# Patient Record
Sex: MALE | Race: WHITE
[De-identification: names, ages, dates, MRNs, and addresses within clinical notes are randomized per-mention and may not be internally consistent; named-entity substitution may affect disease eponyms.]

---

## 2018-03-02 ENCOUNTER — HOSPITAL ENCOUNTER (OUTPATIENT)
Dept: HOSPITAL 80 - FED | Age: 68
Setting detail: OBSERVATION
LOS: 1 days | Discharge: HOME | End: 2018-03-03
Attending: INTERNAL MEDICINE | Admitting: INTERNAL MEDICINE
Payer: COMMERCIAL

## 2018-03-02 DIAGNOSIS — J45.909: ICD-10-CM

## 2018-03-02 DIAGNOSIS — K76.89: ICD-10-CM

## 2018-03-02 DIAGNOSIS — N20.0: ICD-10-CM

## 2018-03-02 DIAGNOSIS — Z88.2: ICD-10-CM

## 2018-03-02 DIAGNOSIS — K52.9: Primary | ICD-10-CM

## 2018-03-02 DIAGNOSIS — Z87.442: ICD-10-CM

## 2018-03-02 PROCEDURE — 96361 HYDRATE IV INFUSION ADD-ON: CPT

## 2018-03-02 PROCEDURE — G0378 HOSPITAL OBSERVATION PER HR: HCPCS

## 2018-03-02 PROCEDURE — 99285 EMERGENCY DEPT VISIT HI MDM: CPT

## 2018-03-02 PROCEDURE — 96374 THER/PROPH/DIAG INJ IV PUSH: CPT

## 2018-03-02 PROCEDURE — 93005 ELECTROCARDIOGRAM TRACING: CPT

## 2018-03-02 PROCEDURE — 74176 CT ABD & PELVIS W/O CONTRAST: CPT

## 2018-03-03 VITALS
TEMPERATURE: 98.1 F | DIASTOLIC BLOOD PRESSURE: 66 MMHG | RESPIRATION RATE: 18 BRPM | HEART RATE: 67 BPM | SYSTOLIC BLOOD PRESSURE: 130 MMHG

## 2018-03-03 VITALS — OXYGEN SATURATION: 95 %

## 2018-03-03 LAB
PLATELET # BLD: 185 10^3/UL (ref 150–400)
PLATELET # BLD: 236 10^3/UL (ref 150–400)

## 2018-03-03 NOTE — CPEKG
Heart Rate: 66

RR Interval: 909

P-R Interval: 160

QRSD Interval: 78

QT Interval: 380

QTC Interval: 399

P Axis: 58

QRS Axis: 22

T Wave Axis: 53

EKG Severity - NORMAL ECG -

EKG Impression: SINUS RHYTHM

Electronically Signed By: Alex Doss 03-Mar-2018 05:37:11

## 2018-03-03 NOTE — ASMTLACE
LACE

 

Length of stay for            Answers:  Less than 1 day                       

current admission                                                             

Acuity / Level of             Answers:  No                                    

Care: Did the patient                                                         

have an inpatient                                                             

admission?                                                                    

# of Emergency department     Answers:  1-2                                   

visits in the last 6                                                          

months                                                                        

Score: 1

 

Date Signed:  03/03/2018 02:05 PM

Electronically Signed By:Hyun Abbasi LCSW

## 2018-03-03 NOTE — EDPHY
H & P


Time Seen by Provider: 03/02/18 23:56


HPI/ROS: 





CHIEF COMPLAINT:  Left flank pain





HISTORY OF PRESENT ILLNESS:  68-year-old male with a known history of kidney 

stones presents to the emergency department with abrupt onset of pain in his 

left upper quadrant..  The patient has had 2 previous kidney stones which she 

was able to pass on his own.  No nausea or vomiting.  Denies urinary symptoms.  

Denies chest pain or difficulty breathing.  He states that it feels similar to 

his previous kidney stones in that it is colicky although he states this is not 

as severe.





REVIEW OF SYSTEMS:


Constitutional:  No fever, no chills.


Eyes:  No double or blurry vision.


ENT:  No sore throat.


Respiratory:  No cough, no shortness of breath.


Cardiac:  No chest pain.


Gastrointestinal:  Abdominal pain as above.  No vomiting or diarrhea.


Genitourinary:  No dysuria.


Musculoskeletal:  No neck or back pain.


Skin:  No rashes.


Neurological:  No headache.


Past Medical/Surgical History: 





Kidney stones, asthma, osteoporosis, hip surgery


Social History: 








Smoking Status: Never smoked


Physical Exam: 





General Appearance:  Alert, no distress.


Eyes:  Pupils equal and round.  Extraocular motions are all intact.


ENT:  Mouth:  Mucous membranes moist.


Respiratory:  No wheezing, rhonchi, or rales, lungs are clear to auscultation.


Cardiovascular:  Regular rate and rhythm.


Gastrointestinal:  Abdomen is soft and nontender, no masses, no rebound or 

guarding, bowel sounds normal.


Neurological:  Alert and oriented x 3, cranial nerves II through XII grossly 

intact


Skin:  Warm and dry, no rashes.


Musculoskeletal:  Nontender to palpate along the cervical, thoracic or lumbar 

spine.  Neck is supple.


Extremities:  Full range of motion and no peripheral edema.


Psychiatric:  Patient is oriented X 3, there is no agitation.


Constitutional: 


 Initial Vital Signs











Temperature (C)  37.3 C   03/02/18 23:43


 


Heart Rate  99   03/02/18 23:43


 


Respiratory Rate  17   03/02/18 23:43


 


Blood Pressure  140/94 H  03/02/18 23:43


 


O2 Sat (%)  92   03/02/18 23:43








 











O2 Delivery Mode               Room Air














Allergies/Adverse Reactions: 


 





Sulfa (Sulfonamide Antibiotics) [Sulfa(Sulfonamide Antibiotics)] Allergy (Severe

, Verified 11/12/12 18:15)


 


aspirin Allergy (Verified 11/12/12 18:15)


 








Home Medications: 














 Medication  Instructions  Recorded


 


Albuterol [Proventil 2 mg (*)] mg PO TID 10/23/12


 


Budesonide mg IH 10/23/12














Medical Decision Making





- Diagnostics


Imaging: Discussed imaging studies w/ On call Radiologist


ED Course/Re-evaluation: 





68-year-old male presents to the emergency department with abdominal pain and 

concerns about recurring kidney stone.  Patient was unable to provide a urine 

specimen initially.  Laboratory studies reveal mildly elevated white blood cell 

count.  Chemistries are unremarkable including normal BUN and creatinine.  

Still waiting on urine from the patient.





CT scan of the abdomen pelvis without contrast reveals no evidence obstructed 

uropathy.  The patient does have a nonobstructing stone in his left kidney in 

the inferior pole.  There was also evidence of fluid filled loops of bowel 

likely consistent with gastroenteritis.  There is also a 2 cm cystic structure 

in the liver which the radiologist recommended outpatient ultrasound for 

further evaluation.





The patient was unable to provide a urine specimen.  He started drinking some 

water in the emergency department.





The case was discussed with Dr. Doss, secondary supervising physician, who did 

not directly evaluate the patient but agrees with treatment and plan.





2:25 a.m.:  The patient states that he is getting recurring abdominal pain and 

is now beginning to vomit.  Patient will be admitted to the hospital.  He was 

given 4 mg of Zofran IV.  Still unable to provide urine.





Patient will be admitted to the hospitalist, Dr. Gates, for observation.





I did inform the patient of the 2 cm cystic structure in the liver that would 

need outpatient ultrasound for follow-up.


Differential Diagnosis: 





Including but not limited to kidney stone, urinary tract infection, 

pyelonephritis





- Data Points


Laboratory Results: 


 Laboratory Results





 03/03/18 00:13 





 03/03/18 00:13 





 











  03/03/18 03/03/18





  00:13 00:13


 


WBC    16.42 10^3/uL H 10^3/uL





    (3.80-9.50) 


 


RBC    5.72 10^6/uL 10^6/uL





    (4.40-6.38) 


 


Hgb    17.7 g/dL H g/dL





    (13.7-17.5) 


 


Hct    51.5 % H %





    (40.0-51.0) 


 


MCV    90.0 fL fL





    (81.5-99.8) 


 


MCH    30.9 pg pg





    (27.9-34.1) 


 


MCHC    34.4 g/dL g/dL





    (32.4-36.7) 


 


RDW    12.8 % %





    (11.5-15.2) 


 


Plt Count    236 10^3/uL 10^3/uL





    (150-400) 


 


MPV    11.0 fL fL





    (8.7-11.7) 


 


Neut % (Auto)    78.0 % H %





    (39.3-74.2) 


 


Lymph % (Auto)    14.8 % L %





    (15.0-45.0) 


 


Mono % (Auto)    5.6 % %





    (4.5-13.0) 


 


Eos % (Auto)    0.7 % %





    (0.6-7.6) 


 


Baso % (Auto)    0.4 % %





    (0.3-1.7) 


 


Nucleat RBC Rel Count    0.0 % %





    (0.0-0.2) 


 


Absolute Neuts (auto)    12.82 10^3/uL H 10^3/uL





    (1.70-6.50) 


 


Absolute Lymphs (auto)    2.43 10^3/uL 10^3/uL





    (1.00-3.00) 


 


Absolute Monos (auto)    0.92 10^3/uL H 10^3/uL





    (0.30-0.80) 


 


Absolute Eos (auto)    0.11 10^3/uL 10^3/uL





    (0.03-0.40) 


 


Absolute Basos (auto)    0.06 10^3/uL 10^3/uL





    (0.02-0.10) 


 


Absolute Nucleated RBC    0.00 10^3/uL 10^3/uL





    (0-0.01) 


 


Immature Gran %    0.5 % %





    (0.0-1.1) 


 


Immature Gran #    0.08 10^3/uL 10^3/uL





    (0.00-0.10) 


 


Sodium  144 mEq/L mEq/L  





   (135-145)  


 


Potassium  4.7 mEq/L mEq/L  





   (3.5-5.2)  


 


Chloride  103 mEq/L mEq/L  





   ()  


 


Carbon Dioxide  26 mEq/l mEq/l  





   (22-31)  


 


Anion Gap  15 mEq/L mEq/L  





   (8-16)  


 


BUN  18 mg/dL mg/dL  





   (7-23)  


 


Creatinine  1.0 mg/dL mg/dL  





   (0.7-1.3)  


 


Estimated GFR  > 60   





   


 


Glucose  163 mg/dL H mg/dL  





   ()  


 


Calcium  10.6 mg/dL H mg/dL  





   (8.5-10.4)  











Medications Given: 


 








Discontinued Medications





Sodium Chloride (Ns)  1,000 mls @ 0 mls/hr IV ONCE ONE


   PRN Reason: Wide Open


   Stop: 03/03/18 01:12


   Last Admin: 03/03/18 01:13 Dose:  1,000 mls


Ondansetron HCl (Zofran)  4 mg IVP EDNOW ONE


   Stop: 03/03/18 02:26


   Last Admin: 03/03/18 02:33 Dose:  4 mg








Departure





- Departure


Disposition: [facility] Inpatient Acute


Clinical Impression: 


 Gastroenteritis





Abdominal pain


Qualifiers:


 Abdominal location: left upper quadrant Qualified Code(s): R10.12 - Left upper 

quadrant pain





Condition: Good


Referrals: 


ROSA MORENO [Primary Care Provider] - As per Instructions

## 2018-03-03 NOTE — GDS
[f rep st]



                                                             DISCHARGE SUMMARY





DIAGNOSIS:  

1.  Gastroenteritis, possibly food-borne.

2.  Nonobstructing kidney stone in left kidney.

3.  History of previous kidney stones.

4.  Asthma.



PROCEDURES DONE:  Abdominal CT scan, nonobstructive lower pole left nephrolithiasis, gastroenteric dy
smotility syndrome, normal appendix, mild uncomplicated diverticulosis, mild prostate gland enlargeme
nt.  There is a 2 x 2 cm left hepatic lobe cystic structure, which should be further evaluated with u
ltrasound at some elective point.



HOSPITAL COURSE:  The patient is a 68-year-old who came in with severe nausea and abdominal pain.  He
 was admitted overnight for bowel rest and did well.  He is eating and drinking this morning without 
significant pain or tenderness.  He did admit to eating some possibly bad strawberries prior to admis
bernice.  Evaluation included the above procedure.  Unfortunately, the CT scan did show cystic lesion on
 his liver, which is likely benign but needs further evaluation as an outpatient with an ultrasound. 
 This can be done by Dr. Gutierrez.  He also had some nonobstructive kidney stones, which were not lik
ely the cause of his pain.



CONDITION ON DISCHARGE:  Good.



FOLLOWUP:  He will follow up with Dr. Gutierrez who can order an ultrasound as an outpatient.



DISCHARGE MEDICATIONS:  Please see discharge medication form.  There were no new medications this adm
ission.





Job #:  [id number]

## 2018-03-03 NOTE — PDGENHP
History and Physical





- Chief Complaint


Abdominal pain





- History of Present Illness


67 yo M w/ asthma presents with abdominal pain. Patient states he was in usual 

state of health until about 5 PM when he developed abdominal pain. He ate some 

strawberries that he deemed "suspicious" but otherwise denies unusual or 

undercooked foods. He also denies recent travel, antibiotic use, sick contacts, 

or flu-like symptoms. After arrival in the ED he also developed vomiting and 

abdominal distention. He denies flatus or diarrhea. 





In the ED CT scan notable for gastroenteritis. Patient is being admitted for 

supportive care.





History Information





- Allergies/Home Medication List


Allergies/Adverse Reactions: 








Sulfa (Sulfonamide Antibiotics) [Sulfa(Sulfonamide Antibiotics)] Allergy (Severe

, Verified 11/12/12 18:15)


 


aspirin Allergy (Verified 11/12/12 18:15)


 





Home Medications: 








Albuterol [Proventil 2 mg (*)] mg PO TID 10/23/12 [Last Taken Unknown]


Budesonide mg IH 10/23/12 [Last Taken Unknown]





I have personally reviewed and updated: family history, medical history





- Past Medical History


asthma





- Family History


Additional family history: Denies family hx of IBD





- Social History


Smoking Status: Never smoked





Review of Systems


Review of Systems: 





ROS: 10pt was reviewed & negative except for what was stated in HPI & below





Physical Exam


Physical Exam: 

















Temp Pulse Resp BP Pulse Ox


 


 36.9 C   78   16   144/96 H  94 


 


 03/03/18 02:45  03/03/18 02:45  03/03/18 02:45  03/03/18 02:45  03/03/18 02:45











Constitutional: appears nourished, uncomfortable


Eyes: PERRL, EOMI


Ears, Nose, Mouth, Throat: moist mucous membranes, no oral mucosal ulcers


Cardiovascular: regular rate and rhythym, no murmur, rub, or gallop


Respiratory: no respiratory distress, no rales or rhonchi


Gastrointestinal: tenderness (Mild, diffuse), distension, other (Decreased BS)


Skin: warm


Musculoskeletal: full muscle strength, no muscle tenderness


Neurologic: AAOx3


Psychiatric: interacting appropriately, not anxious





Lab Data & Imaging Review





 03/03/18 00:13





 03/03/18 00:13














WBC  16.42 10^3/uL (3.80-9.50)  H  03/03/18  00:13    


 


RBC  5.72 10^6/uL (4.40-6.38)   03/03/18  00:13    


 


Hgb  17.7 g/dL (13.7-17.5)  H  03/03/18  00:13    


 


Hct  51.5 % (40.0-51.0)  H  03/03/18  00:13    


 


MCV  90.0 fL (81.5-99.8)   03/03/18  00:13    


 


MCH  30.9 pg (27.9-34.1)   03/03/18  00:13    


 


MCHC  34.4 g/dL (32.4-36.7)   03/03/18  00:13    


 


RDW  12.8 % (11.5-15.2)   03/03/18  00:13    


 


Plt Count  236 10^3/uL (150-400)   03/03/18  00:13    


 


MPV  11.0 fL (8.7-11.7)   03/03/18  00:13    


 


Neut % (Auto)  78.0 % (39.3-74.2)  H  03/03/18  00:13    


 


Lymph % (Auto)  14.8 % (15.0-45.0)  L  03/03/18  00:13    


 


Mono % (Auto)  5.6 % (4.5-13.0)   03/03/18  00:13    


 


Eos % (Auto)  0.7 % (0.6-7.6)   03/03/18  00:13    


 


Baso % (Auto)  0.4 % (0.3-1.7)   03/03/18  00:13    


 


Nucleat RBC Rel Count  0.0 % (0.0-0.2)   03/03/18  00:13    


 


Absolute Neuts (auto)  12.82 10^3/uL (1.70-6.50)  H  03/03/18  00:13    


 


Absolute Lymphs (auto)  2.43 10^3/uL (1.00-3.00)   03/03/18  00:13    


 


Absolute Monos (auto)  0.92 10^3/uL (0.30-0.80)  H  03/03/18  00:13    


 


Absolute Eos (auto)  0.11 10^3/uL (0.03-0.40)   03/03/18  00:13    


 


Absolute Basos (auto)  0.06 10^3/uL (0.02-0.10)   03/03/18  00:13    


 


Absolute Nucleated RBC  0.00 10^3/uL (0-0.01)   03/03/18  00:13    


 


Immature Gran %  0.5 % (0.0-1.1)   03/03/18  00:13    


 


Immature Gran #  0.08 10^3/uL (0.00-0.10)   03/03/18  00:13    


 


Sodium  144 mEq/L (135-145)   03/03/18  00:13    


 


Potassium  4.7 mEq/L (3.5-5.2)   03/03/18  00:13    


 


Chloride  103 mEq/L ()   03/03/18  00:13    


 


Carbon Dioxide  26 mEq/l (22-31)   03/03/18  00:13    


 


Anion Gap  15 mEq/L (8-16)   03/03/18  00:13    


 


BUN  18 mg/dL (7-23)   03/03/18  00:13    


 


Creatinine  1.0 mg/dL (0.7-1.3)   03/03/18  00:13    


 


Estimated GFR  > 60   03/03/18  00:13    


 


Glucose  163 mg/dL ()  H  03/03/18  00:13    


 


Calcium  10.6 mg/dL (8.5-10.4)  H  03/03/18  00:13    








Imaging Review: 


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 

levels but normal calibered distal SB and LB. No obstructing mass. Minimal 

interloop mesenteric fat inflammation. Probable gastroenteritis, less likely 

partial or intermittent SBO. No free air. 





There is a 2.0 x 2.1 cm post med LHL cyst (could be further confirmed with U/S) 





Nonobstructive 4 mm LP LK calc. 





Prostate enlarged (4.5 x 5.4 x 4.7 cm). 





Orthopedic pins, prox right femur. 





MIld GGO inferior lingula, lat LLL 





Normal appx; few scattered sigmoid tics, but no inflamm. 





Results called to Catarina Salcido at 1:14 am. MB 





Assessment & Plan


Assessment: 








67 yo M w/ asthma presents w/ abdominal pain.





Plan: 


1. Abdominal pain - Gastroenteritis on CT, less likely SBO. Unclear etiology as 

patient denies symptoms of illness, sick contacts, recent travel or antibiotic 

use. He did eat some "suspicious" strawberries earlier today so there may be 

some element of pre-formed toxins.


- Admit for observation


- Check LFTs


- Supportive care w/ mIVF, clear liquids, and anti-emetics


- If pain or vomiting worsen, will place NGT


- GI PCR if diarrhea develops


2. Asthma - No evidence of acute exacerbation


3. Hepatic cyst - Incidentally found on CT, merits outpatient follow-up.





Diet - Clears


Code - Full


Ppx - LMWH


Dispo - Admit under observation status